# Patient Record
Sex: FEMALE | Race: WHITE | Employment: OTHER | URBAN - METROPOLITAN AREA
[De-identification: names, ages, dates, MRNs, and addresses within clinical notes are randomized per-mention and may not be internally consistent; named-entity substitution may affect disease eponyms.]

---

## 2019-12-07 ENCOUNTER — HOSPITAL ENCOUNTER (EMERGENCY)
Age: 54
Discharge: HOME | End: 2019-12-07
Attending: STUDENT IN AN ORGANIZED HEALTH CARE EDUCATION/TRAINING PROGRAM
Payer: MEDICARE

## 2019-12-07 ENCOUNTER — APPOINTMENT (OUTPATIENT)
Dept: CT IMAGING | Age: 54
End: 2019-12-07
Attending: STUDENT IN AN ORGANIZED HEALTH CARE EDUCATION/TRAINING PROGRAM
Payer: MEDICARE

## 2019-12-07 ENCOUNTER — APPOINTMENT (OUTPATIENT)
Dept: GENERAL RADIOLOGY | Age: 54
End: 2019-12-07
Attending: STUDENT IN AN ORGANIZED HEALTH CARE EDUCATION/TRAINING PROGRAM
Payer: MEDICARE

## 2019-12-07 VITALS
RESPIRATION RATE: 19 BRPM | HEART RATE: 78 BPM | DIASTOLIC BLOOD PRESSURE: 64 MMHG | SYSTOLIC BLOOD PRESSURE: 120 MMHG | OXYGEN SATURATION: 97 %

## 2019-12-07 DIAGNOSIS — T40.2X1A OPIOID OVERDOSE, ACCIDENTAL OR UNINTENTIONAL, INITIAL ENCOUNTER (HCC): ICD-10-CM
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
## 2019-12-07 LAB
ALBUMIN SERPL-MCNC: 3.4 G/DL (ref 3.5–5)
ALBUMIN/GLOB SERPL: 1.1 {RATIO} (ref 1.1–2.2)
ALP SERPL-CCNC: 47 U/L (ref 45–117)
ALT SERPL-CCNC: 11 U/L (ref 12–78)
AMPHET UR QL SCN: NEGATIVE
ANION GAP SERPL CALC-SCNC: 8 MMOL/L (ref 5–15)
APPEARANCE UR: CLEAR
AST SERPL-CCNC: 20 U/L (ref 15–37)
BACTERIA URNS QL MICRO: NEGATIVE /HPF
BARBITURATES UR QL SCN: NEGATIVE
BASOPHILS # BLD: 0 K/UL (ref 0–0.1)
BASOPHILS NFR BLD: 0 % (ref 0–1)
BENZODIAZ UR QL: NEGATIVE
BILIRUB SERPL-MCNC: 0.6 MG/DL (ref 0.2–1)
BILIRUB UR QL: NEGATIVE
BUN SERPL-MCNC: 13 MG/DL (ref 6–20)
BUN/CREAT SERPL: 16 (ref 12–20)
CALCIUM SERPL-MCNC: 8.2 MG/DL (ref 8.5–10.1)
CANNABINOIDS UR QL SCN: NEGATIVE
CHLORIDE SERPL-SCNC: 106 MMOL/L (ref 97–108)
CO2 SERPL-SCNC: 24 MMOL/L (ref 21–32)
COCAINE UR QL SCN: NEGATIVE
COLOR UR: ABNORMAL
CREAT SERPL-MCNC: 0.83 MG/DL (ref 0.55–1.02)
DIFFERENTIAL METHOD BLD: ABNORMAL
DRUG SCRN COMMENT,DRGCM: ABNORMAL
EOSINOPHIL # BLD: 0.3 K/UL (ref 0–0.4)
EOSINOPHIL NFR BLD: 3 % (ref 0–7)
EPITH CASTS URNS QL MICRO: ABNORMAL /LPF
ERYTHROCYTE [DISTWIDTH] IN BLOOD BY AUTOMATED COUNT: 13.5 % (ref 11.5–14.5)
ETHANOL SERPL-MCNC: <10 MG/DL
GLOBULIN SER CALC-MCNC: 3 G/DL (ref 2–4)
GLUCOSE BLD STRIP.AUTO-MCNC: 72 MG/DL (ref 65–100)
GLUCOSE BLD STRIP.AUTO-MCNC: 74 MG/DL (ref 65–100)
GLUCOSE SERPL-MCNC: 79 MG/DL (ref 65–100)
GLUCOSE UR STRIP.AUTO-MCNC: NEGATIVE MG/DL
HCT VFR BLD AUTO: 32.9 % (ref 35–47)
HGB BLD-MCNC: 10.9 G/DL (ref 11.5–16)
HGB UR QL STRIP: ABNORMAL
HYALINE CASTS URNS QL MICRO: ABNORMAL /LPF (ref 0–5)
IMM GRANULOCYTES # BLD AUTO: 0 K/UL (ref 0–0.04)
IMM GRANULOCYTES NFR BLD AUTO: 0 % (ref 0–0.5)
INR PPP: 1 (ref 0.9–1.1)
KETONES UR QL STRIP.AUTO: ABNORMAL MG/DL
LEUKOCYTE ESTERASE UR QL STRIP.AUTO: ABNORMAL
LYMPHOCYTES # BLD: 1.1 K/UL (ref 0.8–3.5)
LYMPHOCYTES NFR BLD: 11 % (ref 12–49)
MCH RBC QN AUTO: 32.2 PG (ref 26–34)
MCHC RBC AUTO-ENTMCNC: 33.1 G/DL (ref 30–36.5)
MCV RBC AUTO: 97.3 FL (ref 80–99)
METHADONE UR QL: NEGATIVE
MONOCYTES # BLD: 1 K/UL (ref 0–1)
MONOCYTES NFR BLD: 10 % (ref 5–13)
NEUTS SEG # BLD: 7.5 K/UL (ref 1.8–8)
NEUTS SEG NFR BLD: 76 % (ref 32–75)
NITRITE UR QL STRIP.AUTO: NEGATIVE
NRBC # BLD: 0 K/UL (ref 0–0.01)
NRBC BLD-RTO: 0 PER 100 WBC
OPIATES UR QL: POSITIVE
PCP UR QL: NEGATIVE
PH UR STRIP: 6 [PH] (ref 5–8)
PLATELET # BLD AUTO: 219 K/UL (ref 150–400)
PMV BLD AUTO: 10.4 FL (ref 8.9–12.9)
POTASSIUM SERPL-SCNC: 3.3 MMOL/L (ref 3.5–5.1)
PROT SERPL-MCNC: 6.4 G/DL (ref 6.4–8.2)
PROT UR STRIP-MCNC: NEGATIVE MG/DL
PROTHROMBIN TIME: 10.3 SEC (ref 9–11.1)
RBC # BLD AUTO: 3.38 M/UL (ref 3.8–5.2)
RBC #/AREA URNS HPF: ABNORMAL /HPF (ref 0–5)
SERVICE CMNT-IMP: NORMAL
SERVICE CMNT-IMP: NORMAL
SODIUM SERPL-SCNC: 138 MMOL/L (ref 136–145)
SP GR UR REFRACTOMETRY: 1.01 (ref 1–1.03)
TROPONIN I SERPL-MCNC: <0.05 NG/ML
UR CULT HOLD, URHOLD: NORMAL
UROBILINOGEN UR QL STRIP.AUTO: 0.2 EU/DL (ref 0.2–1)
WBC # BLD AUTO: 10 K/UL (ref 3.6–11)
WBC URNS QL MICRO: ABNORMAL /HPF (ref 0–4)

## 2019-12-07 PROCEDURE — 36415 COLL VENOUS BLD VENIPUNCTURE: CPT

## 2019-12-07 PROCEDURE — 80307 DRUG TEST PRSMV CHEM ANLYZR: CPT

## 2019-12-07 PROCEDURE — 85610 PROTHROMBIN TIME: CPT

## 2019-12-07 PROCEDURE — 74011250636 HC RX REV CODE- 250/636

## 2019-12-07 PROCEDURE — 99284 EMERGENCY DEPT VISIT MOD MDM: CPT

## 2019-12-07 PROCEDURE — 71045 X-RAY EXAM CHEST 1 VIEW: CPT

## 2019-12-07 PROCEDURE — 93005 ELECTROCARDIOGRAM TRACING: CPT

## 2019-12-07 PROCEDURE — 72125 CT NECK SPINE W/O DYE: CPT

## 2019-12-07 PROCEDURE — 70450 CT HEAD/BRAIN W/O DYE: CPT

## 2019-12-07 PROCEDURE — 80053 COMPREHEN METABOLIC PANEL: CPT

## 2019-12-07 PROCEDURE — 85025 COMPLETE CBC W/AUTO DIFF WBC: CPT

## 2019-12-07 PROCEDURE — 96376 TX/PRO/DX INJ SAME DRUG ADON: CPT

## 2019-12-07 PROCEDURE — 81001 URINALYSIS AUTO W/SCOPE: CPT

## 2019-12-07 PROCEDURE — 74011250636 HC RX REV CODE- 250/636: Performed by: STUDENT IN AN ORGANIZED HEALTH CARE EDUCATION/TRAINING PROGRAM

## 2019-12-07 PROCEDURE — 72131 CT LUMBAR SPINE W/O DYE: CPT

## 2019-12-07 PROCEDURE — 96374 THER/PROPH/DIAG INJ IV PUSH: CPT

## 2019-12-07 PROCEDURE — 82962 GLUCOSE BLOOD TEST: CPT

## 2019-12-07 PROCEDURE — 84484 ASSAY OF TROPONIN QUANT: CPT

## 2019-12-07 RX ORDER — DULOXETIN HYDROCHLORIDE 60 MG/1
60 CAPSULE, DELAYED RELEASE ORAL DAILY
COMMUNITY

## 2019-12-07 RX ORDER — MORPHINE SULFATE 30 MG/1
30 TABLET, FILM COATED, EXTENDED RELEASE ORAL EVERY 8 HOURS
COMMUNITY

## 2019-12-07 RX ORDER — NALOXONE HYDROCHLORIDE 1 MG/ML
INJECTION INTRAMUSCULAR; INTRAVENOUS; SUBCUTANEOUS
Status: DISCONTINUED
Start: 2019-12-07 | End: 2019-12-07 | Stop reason: HOSPADM

## 2019-12-07 RX ORDER — PANTOPRAZOLE SODIUM 40 MG/1
40 TABLET, DELAYED RELEASE ORAL DAILY
COMMUNITY

## 2019-12-07 RX ORDER — HYDROXYCHLOROQUINE SULFATE 200 MG/1
200 TABLET, FILM COATED ORAL DAILY
COMMUNITY

## 2019-12-07 RX ORDER — MORPHINE SULFATE 15 MG/1
15 TABLET ORAL
COMMUNITY

## 2019-12-07 RX ORDER — CYANOCOBALAMIN 1000 UG/ML
1000 INJECTION, SOLUTION INTRAMUSCULAR; SUBCUTANEOUS ONCE
COMMUNITY

## 2019-12-07 RX ORDER — HEPARIN 100 UNIT/ML
300 SYRINGE INTRAVENOUS
Status: COMPLETED | OUTPATIENT
Start: 2019-12-07 | End: 2019-12-07

## 2019-12-07 RX ORDER — PREGABALIN 200 MG/1
200 CAPSULE ORAL 2 TIMES DAILY
COMMUNITY

## 2019-12-07 RX ORDER — NALOXONE HYDROCHLORIDE 1 MG/ML
0.4 INJECTION INTRAMUSCULAR; INTRAVENOUS; SUBCUTANEOUS
Status: DISCONTINUED | OUTPATIENT
Start: 2019-12-07 | End: 2019-12-07 | Stop reason: HOSPADM

## 2019-12-07 RX ORDER — NALOXONE HYDROCHLORIDE 0.4 MG/ML
INJECTION, SOLUTION INTRAMUSCULAR; INTRAVENOUS; SUBCUTANEOUS
Status: COMPLETED
Start: 2019-12-07 | End: 2019-12-07

## 2019-12-07 RX ORDER — SODIUM CHLORIDE 0.9 % (FLUSH) 0.9 %
10 SYRINGE (ML) INJECTION AS NEEDED
Status: DISCONTINUED | OUTPATIENT
Start: 2019-12-07 | End: 2019-12-07 | Stop reason: HOSPADM

## 2019-12-07 RX ORDER — BACLOFEN 10 MG/1
10 TABLET ORAL 3 TIMES DAILY
COMMUNITY

## 2019-12-07 RX ORDER — DIVALPROEX SODIUM 500 MG/1
500 TABLET, DELAYED RELEASE ORAL 2 TIMES DAILY
COMMUNITY

## 2019-12-07 RX ORDER — TOPIRAMATE 50 MG/1
50 TABLET, FILM COATED ORAL 2 TIMES DAILY
COMMUNITY

## 2019-12-07 RX ORDER — KETOROLAC TROMETHAMINE 30 MG/ML
15 INJECTION, SOLUTION INTRAMUSCULAR; INTRAVENOUS
Status: DISCONTINUED | OUTPATIENT
Start: 2019-12-07 | End: 2019-12-07 | Stop reason: HOSPADM

## 2019-12-07 RX ORDER — NALOXONE HYDROCHLORIDE 1 MG/ML
0.4 INJECTION INTRAMUSCULAR; INTRAVENOUS; SUBCUTANEOUS
Status: DISCONTINUED | OUTPATIENT
Start: 2019-12-07 | End: 2019-12-07

## 2019-12-07 RX ORDER — NALOXONE HYDROCHLORIDE 1 MG/ML
0.2 INJECTION INTRAMUSCULAR; INTRAVENOUS; SUBCUTANEOUS
Status: COMPLETED | OUTPATIENT
Start: 2019-12-07 | End: 2019-12-07

## 2019-12-07 RX ORDER — METOPROLOL TARTRATE 50 MG/1
50 TABLET ORAL DAILY
COMMUNITY

## 2019-12-07 RX ORDER — IBUPROFEN 200 MG
800 CAPSULE ORAL
COMMUNITY

## 2019-12-07 RX ORDER — MEMANTINE HYDROCHLORIDE 10 MG/1
10 TABLET ORAL DAILY
COMMUNITY

## 2019-12-07 RX ADMIN — NALOXONE HYDROCHLORIDE 0.2 MG: 1 INJECTION PARENTERAL at 18:48

## 2019-12-07 RX ADMIN — NALXONE HYDROCHLORIDE 0.4 MG: 0.4 INJECTION INTRAMUSCULAR; INTRAVENOUS; SUBCUTANEOUS at 16:20

## 2019-12-07 RX ADMIN — HEPARIN 300 UNITS: 100 SYRINGE at 19:22

## 2019-12-07 RX ADMIN — Medication 10 ML: at 19:23

## 2019-12-07 NOTE — ED NOTES
Bedside and Verbal shift change report given to Opal Tabares  (oncoming nurse) by Tia Oppenheim  (offgoing nurse). Report included the following information SBAR, Kardex, ED Summary, Procedure Summary, Intake/Output, MAR, Accordion, Recent Results and Med Rec Status.

## 2019-12-07 NOTE — ED PROVIDER NOTES
48 y.o. female with past medical history significant for Lupus who presents from home with chief complaint of GLF. Pt presents with AMS and lower back pain post GLF that occurred about an hour ago when the Pt was found fallen in the shower. Per Pt's , Pt has chronic fatigue due to lupus, but was completely alert three hours ago. After the fall, the Pt seemed very fatigued mentally altered. Pt's  denies any EtOH consumption in the Pt, but states that Pt takes medications for lupus which include morphine. Pt denies taking any medications today. Pt's  denies any fever. There are no other acute medical concerns at this time. Social hx: Denies EtOH consumption. Pt takes medications including morphine for treatment of lupus. PCP: No primary care provider on file. Note written by Yunier Berman, as dictated by Jaswinder Leroy MD 4:22 PM    Full history, physical exam, and ROS unable to be obtained due to:  AMS. The history is provided by the patient, the spouse and a parent. No past medical history on file. No past surgical history on file. No family history on file.     Social History     Socioeconomic History    Marital status: Not on file     Spouse name: Not on file    Number of children: Not on file    Years of education: Not on file    Highest education level: Not on file   Occupational History    Not on file   Social Needs    Financial resource strain: Not on file    Food insecurity:     Worry: Not on file     Inability: Not on file    Transportation needs:     Medical: Not on file     Non-medical: Not on file   Tobacco Use    Smoking status: Not on file   Substance and Sexual Activity    Alcohol use: Not on file    Drug use: Not on file    Sexual activity: Not on file   Lifestyle    Physical activity:     Days per week: Not on file     Minutes per session: Not on file    Stress: Not on file   Relationships    Social connections:     Talks on phone: Not on file     Gets together: Not on file     Attends Yarsani service: Not on file     Active member of club or organization: Not on file     Attends meetings of clubs or organizations: Not on file     Relationship status: Not on file    Intimate partner violence:     Fear of current or ex partner: Not on file     Emotionally abused: Not on file     Physically abused: Not on file     Forced sexual activity: Not on file   Other Topics Concern    Not on file   Social History Narrative    Not on file         ALLERGIES: Sulfa (sulfonamide antibiotics)    Review of Systems   Unable to perform ROS: Mental status change       Vitals:    12/07/19 1559   BP: 98/61   Pulse: 91   Resp: 8   SpO2: 90%            Physical Exam  Constitutional:       General: She is not in acute distress. Appearance: She is well-developed. Comments: somnoid with periods of apnea   HENT:      Head: Normocephalic and atraumatic. Eyes:      Conjunctiva/sclera: Conjunctivae normal.   Neck:      Musculoskeletal: Normal range of motion and neck supple. Cardiovascular:      Rate and Rhythm: Normal rate and regular rhythm. Heart sounds: Normal heart sounds. Pulmonary:      Effort: Pulmonary effort is normal. No respiratory distress. Breath sounds: Normal breath sounds. Chest:      Comments: portacath over left chest wall  Abdominal:      Palpations: Abdomen is soft. Tenderness: There is no tenderness. There is no guarding. Musculoskeletal: Normal range of motion. Right lower leg: Edema (trace) present. Left lower leg: Edema (trace) present. Skin:     General: Skin is warm and dry. Neurological:      Mental Status: She is oriented to person, place, and time. Motor: No abnormal muscle tone. Note written by Yunier Bridges, as dictated by Fide Vera MD 4:22 PM       [facility]       Procedures    EKG interpretation: 16:13  Rhythm: normal sinus rhythm; and regular .  Rate (approx.): 81; Axis: normal; Intervals: prolonged QTc at 494 ms; ST/T wave: non-specific changes, no STEMI; likely LVH; EKG documented and interpreted by José Ames MD, ED MD.    6:33 PM  The patient presented with a complaint of a fall or minor trauma. The patient is now resting comfortably and feels better, is alert and in no distress. The patient has become drowsy again but awakens with sternal rub and is neurologically intact. Will give a second dose of Narcan here. Pt recently here from [place]. Took red-eye flight and arrived Thursday morning. Not much sleep since then given care needed for her father who has dementia. The history, exam, diagnostic testing (if any) and current condition do not demonstrate signs of clinically significant intra-cranial, intra-thoracic, intra-abdominal, or musculoskeletal trauma. The vital signs have been stable. 7:06 PM  The patient is up and walking around floor. Went to rest room. The patient's condition is now stable and appropriate for discharge. The patient will pursue further outpatient evaluation with the primary care physician or other designated or consulting physician as indicated in the discharge instructions.

## 2019-12-07 NOTE — ED TRIAGE NOTES
Patient arrives with family who state she was found in the tub after falling; responsive to voice, snoring respirations in triage. Family state patient has Lupus and may have taken medications which includes Morphine but unknown if patient presentation is a result of medications or the fall. Prior to fall, patient was alert per family. Patient taken immediately to ED 2; charge RN Mariela Urban and primary RN Mitch notified and to bedside.

## 2019-12-07 NOTE — ED NOTES
Flight from [place] to [facility] Thursday morning. Patient is in town visiting parents with her , patient's father recently placed in dementia unit.

## 2019-12-07 NOTE — DISCHARGE INSTRUCTIONS
Patient Education        Preventing Falls: Care Instructions  Your Care Instructions    Getting around your home safely can be a challenge if you have injuries or health problems that make it easy for you to fall. Loose rugs and furniture in walkways are among the dangers for many older people who have problems walking or who have poor eyesight. People who have conditions such as arthritis, osteoporosis, or dementia also have to be careful not to fall. You can make your home safer with a few simple measures. Follow-up care is a key part of your treatment and safety. Be sure to make and go to all appointments, and call your doctor if you are having problems. It's also a good idea to know your test results and keep a list of the medicines you take. How can you care for yourself at home? Taking care of yourself  · You may get dizzy if you do not drink enough water. To prevent dehydration, drink plenty of fluids, enough so that your urine is light yellow or clear like water. Choose water and other caffeine-free clear liquids. If you have kidney, heart, or liver disease and have to limit fluids, talk with your doctor before you increase the amount of fluids you drink. · Exercise regularly to improve your strength, muscle tone, and balance. Walk if you can. Swimming may be a good choice if you cannot walk easily. · Have your vision and hearing checked each year or any time you notice a change. If you have trouble seeing and hearing, you might not be able to avoid objects and could lose your balance. · Know the side effects of the medicines you take. Ask your doctor or pharmacist whether the medicines you take can affect your balance. Sleeping pills or sedatives can affect your balance. · Limit the amount of alcohol you drink. Alcohol can impair your balance and other senses. · Ask your doctor whether calluses or corns on your feet need to be removed.  If you wear loose-fitting shoes because of calluses or corns, you can lose your balance and fall. · Talk to your doctor if you have numbness in your feet. Preventing falls at home  · Remove raised doorway thresholds, throw rugs, and clutter. Repair loose carpet or raised areas in the floor. · Move furniture and electrical cords to keep them out of walking paths. · Use nonskid floor wax, and wipe up spills right away, especially on ceramic tile floors. · If you use a walker or cane, put rubber tips on it. If you use crutches, clean the bottoms of them regularly with an abrasive pad, such as steel wool. · Keep your house well lit, especially [address], and outside walkways. Use night-lights in areas such as hallways and bathrooms. Add extra light switches or use remote switches (such as switches that go on or off when you clap your hands) to make it easier to turn lights on if you have to get up during the night. · Install sturdy handrails on stairways. · Move items in your cabinets so that the things you use a lot are on the lower shelves (about waist level). · Keep a cordless phone and a flashlight with new batteries by your bed. If possible, put a phone in each of the main rooms of your house, or carry a cell phone in case you fall and cannot reach a phone. Or, you can wear a device around your neck or wrist. You push a button that sends a signal for help. · Wear low-heeled shoes that fit well and give your feet good support. Use footwear with nonskid soles. Check the heels and soles of your shoes for wear. Repair or replace worn heels or soles. · Do not wear socks without shoes on wood floors. · Walk on the grass when the sidewalks are slippery. If you live in an area that gets snow and ice in the winter, sprinkle salt on slippery steps and sidewalks. Preventing falls in the bath  · Install grab bars and nonskid mats inside and outside your shower or tub and near the toilet and sinks. · Use shower chairs and bath benches.   · Use a hand-held shower head that will allow you to sit while showering. · Get into a tub or shower by putting the weaker leg in first. Get out of a tub or shower with your strong side first.  · Repair loose toilet seats and consider installing a raised toilet seat to make getting on and off the toilet easier. · Keep your bathroom door unlocked while you are in the shower. Where can you learn more? Go to http://jorge-lexx.info/. Enter 0476 [id number] 69 [id number] in the search box to learn more about \"Preventing Falls: Care Instructions. \"  Current as of: November 7, 2018  Content Version: 12.2  © 9077-2461 [employer]. Care instructions adapted under license by [employer] (which disclaims liability or warranty for this information). If you have questions about a medical condition or this instruction, always ask your healthcare professional. [place] [address] any warranty or liability for your use of this information. Patient Education        Opioid Overdose: Care Instructions  Your Care Instructions    You have had treatment to help your body recover from taking too much of an opioid. You are getting better, but you may not feel well for a while. It takes time for the opioids to leave your body. How long it takes to feel better depends on which drug you took and how much you took of it. Opioids include illegal drugs such as heroin, often called smack, junk, H, and ska. Opioids also include medicines that doctors prescribe to treat pain. These are medicines such as oxycodone, methadone, and buprenorphine. They are sometimes sold and used illegally. Taking too much of an opioid can be dangerous. It may cause:  · Trouble breathing. · Low blood pressure. · A low heart rate. · A coma. When the doctor treated you for the overdose, he or she may have:  · Watched your symptoms or done tests to find out what kind of drug you took. · Given you fluids.   · Given you oxygen to help you breathe. · Given you a medicine called naloxone to help reverse the effects of the opioid. · Done several tests, including blood tests, to see how you're responding to treatment. The doctor also watched you carefully to make sure you were recovering safely. Follow-up care is a key part of your treatment and safety. Be sure to make and go to all appointments, and call your doctor if you are having problems. It's also a good idea to know your test results and keep a list of the medicines you take. How can you care for yourself at home? · If you take opioids regularly, your body gets used to them. This is called physical dependence. If you are physically dependent on opioids, you may have withdrawal symptoms when you stop using them or use less. These symptoms can include nausea, sweating, chills, diarrhea, stomach cramps, and muscle aches. Withdrawal can last up to several weeks, depending on which opioid you took and how long you took it. You may feel very ill, but you probably aren't in medical danger. · Your doctor may give you medicine to help you feel better. To help you get through withdrawal, you can also:  ? Get plenty of rest.  ? Drink plenty of fluids. ? Stay active. ? Eat a healthy diet. · If you had a tube in your throat to help you breathe, you may have a sore throat or hoarseness that can last a few days. Sip liquids to help soothe your throat. · Do not drink alcohol or take illegal drugs. · Do not take medicines that make you tired, like sleeping pills or muscle relaxers. · Do not drive if you feel sleepy or groggy while you recover from an overdose. · Get help to stop using opioids. Talk to your doctor about substance use treatment programs. · Talk to your doctor or pharmacist about having a naloxone rescue kit on hand. When should you call for help? Call 911 anytime you think you may need emergency care.  For example, call if:    · You feel you cannot stop from hurting yourself or someone else.   [facility] your doctor now or seek immediate medical care if:    · You have new or worse withdrawal symptoms, such as:  ? Stomach cramps. ? Vomiting. ? Diarrhea. ? Muscle aches. ? Sweating.    Watch closely for changes in your health, and be sure to contact your doctor if:    · You do not get better as expected. Where can you learn more? Go to http://jorge-lexx.info/. Enter [id number] in the search box to learn more about \"Opioid Overdose: Care Instructions. \"  Current as of: February 5, 2019  Content Version: 12.2  © 5269-5479 [employer]. Care instructions adapted under license by [employer] (which disclaims liability or warranty for this information). If you have questions about a medical condition or this instruction, always ask your healthcare professional. [address] any warranty or liability for your use of this information.

## 2019-12-08 LAB
ATRIAL RATE: 81 BPM
CALCULATED P AXIS, ECG09: 25 DEGREES
CALCULATED R AXIS, ECG10: -13 DEGREES
CALCULATED T AXIS, ECG11: 14 DEGREES
DIAGNOSIS, 93000: NORMAL
P-R INTERVAL, ECG05: 142 MS
Q-T INTERVAL, ECG07: 426 MS
QRS DURATION, ECG06: 94 MS
QTC CALCULATION (BEZET), ECG08: 494 MS
VENTRICULAR RATE, ECG03: 81 BPM

## 2025-06-04 ENCOUNTER — HOSPITAL ENCOUNTER (EMERGENCY)
Facility: HOSPITAL | Age: 60
Discharge: HOME | End: 2025-06-04
Attending: STUDENT IN AN ORGANIZED HEALTH CARE EDUCATION/TRAINING PROGRAM
Payer: MEDICARE

## 2025-06-04 ENCOUNTER — APPOINTMENT (OUTPATIENT)
Facility: HOSPITAL | Age: 60
End: 2025-06-04
Payer: MEDICARE

## 2025-06-04 VITALS
WEIGHT: 143 LBS | TEMPERATURE: 99.9 F | SYSTOLIC BLOOD PRESSURE: 112 MMHG | HEIGHT: 60 IN | OXYGEN SATURATION: 100 % | RESPIRATION RATE: 18 BRPM | DIASTOLIC BLOOD PRESSURE: 75 MMHG | BODY MASS INDEX: 28.07 KG/M2 | HEART RATE: 120 BPM

## 2025-06-04 DIAGNOSIS — S92.255A CLOSED NONDISPLACED FRACTURE OF NAVICULAR BONE OF LEFT FOOT, INITIAL ENCOUNTER: Primary | ICD-10-CM

## 2025-06-04 DIAGNOSIS — S93.402A SPRAIN OF LEFT ANKLE, UNSPECIFIED LIGAMENT, INITIAL ENCOUNTER: ICD-10-CM

## 2025-06-04 PROCEDURE — 73610 X-RAY EXAM OF ANKLE: CPT

## 2025-06-04 PROCEDURE — 99283 EMERGENCY DEPT VISIT LOW MDM: CPT

## 2025-06-04 PROCEDURE — 73630 X-RAY EXAM OF FOOT: CPT

## 2025-06-04 ASSESSMENT — ENCOUNTER SYMPTOMS
EYE PAIN: 0
BACK PAIN: 0
DIARRHEA: 0
NAUSEA: 0
ABDOMINAL PAIN: 0
CHEST TIGHTNESS: 0
SORE THROAT: 0
VOMITING: 0
FACIAL SWELLING: 0
EYE DISCHARGE: 0
SHORTNESS OF BREATH: 0
COUGH: 0

## 2025-06-04 ASSESSMENT — PAIN - FUNCTIONAL ASSESSMENT: PAIN_FUNCTIONAL_ASSESSMENT: 0-10

## 2025-06-04 ASSESSMENT — PAIN DESCRIPTION - ORIENTATION: ORIENTATION: LEFT

## 2025-06-04 ASSESSMENT — PAIN DESCRIPTION - LOCATION: LOCATION: ANKLE;FOOT

## 2025-06-04 ASSESSMENT — PAIN SCALES - GENERAL: PAINLEVEL_OUTOF10: 10

## 2025-06-04 NOTE — ED PROVIDER NOTES
[facility] EMERGENCY DEPARTMENT  EMERGENCY DEPARTMENT ENCOUNTER      Pt Name: Aurora Mitchell  MRN: [id number]  Birthdate 1965  Date of evaluation: 6/4/2025  Provider: Rafael Toro MD    CHIEF COMPLAINT       Chief Complaint   Patient presents with    Ankle Pain         HISTORY OF PRESENT ILLNESS   (Location/Symptom, Timing/Onset, Context/Setting, Quality, Duration, Modifying Factors, Severity)  Note limiting factors.   59-year-old female with severe rheumatoid arthritis presents today with left ankle and foot injury from twisting the ankle 2 days ago.  No lacerations or open wound to this injury.  Mild swelling and difficulty bearing weight.  She has had bilateral knee replacements and takes morphine daily for chronic pain.    The history is provided by the patient.   Leg Injury  Location:  Foot and ankle  Injury: yes    Mechanism of injury: fall    Ankle location:  L ankle  Foot location:  L foot  Pain details:     Quality:  Aching    Radiates to:  Does not radiate    Severity:  Mild    Onset quality:  Gradual    Timing:  Constant    Progression:  Worsening  Associated symptoms: no back pain          Review of External Medical Records:     Nursing Notes were reviewed.    REVIEW OF SYSTEMS    (2-9 systems for level 4, 10 or more for level 5)     Review of Systems   Constitutional:  Negative for activity change, appetite change, chills and diaphoresis.   HENT:  Negative for congestion, ear pain, facial swelling and sore throat.    Eyes:  Negative for pain, discharge and visual disturbance.   Respiratory:  Negative for cough, chest tightness and shortness of breath.    Cardiovascular:  Negative for chest pain and palpitations.   Gastrointestinal:  Negative for abdominal pain, diarrhea, nausea and vomiting.   Endocrine: Negative for cold intolerance, heat intolerance, polydipsia and polyphagia.   Genitourinary:  Negative for difficulty urinating, dysuria, frequency, hematuria, urgency  Systolic BP Percentile Diastolic BP Percentile Temp Temp Source Pulse Respirations SpO2   116/82 -- -- 100.2 °F (37.9 °C) Oral (!) 120 16 97 %      Height Weight - Scale         1.524 m (5') 64.9 kg (143 lb)             Body mass index is 27.93 kg/m².    Physical Exam  Vitals and nursing note reviewed.   Constitutional:       Appearance: She is normal weight.   HENT:      Head: Normocephalic and atraumatic.   Eyes:      Extraocular Movements: Extraocular movements intact.      Conjunctiva/sclera: Conjunctivae normal.      Pupils: Pupils are equal, round, and reactive to light.   Cardiovascular:      Rate and Rhythm: Normal rate and regular rhythm.      Pulses: Normal pulses.      Heart sounds: Normal heart sounds. No murmur heard.  Pulmonary:      Effort: Pulmonary effort is normal. No respiratory distress.      Breath sounds: No stridor. No wheezing.   Abdominal:      General: Abdomen is flat. Bowel sounds are normal. There is no distension.      Palpations: Abdomen is soft.      Tenderness: There is no abdominal tenderness.   Musculoskeletal:         General: Normal range of motion.      Cervical back: Normal range of motion. No rigidity.   Lymphadenopathy:      Cervical: No cervical adenopathy.   Skin:     General: Skin is warm and dry.   Neurological:      General: No focal deficit present.      Mental Status: She is alert and oriented to person, place, and time. Mental status is at baseline.   Psychiatric:         Mood and Affect: Mood normal.         Behavior: Behavior normal.         DIAGNOSTIC RESULTS     EKG: All EKG's are interpreted by the Emergency Department Physician who either signs or Co-signs this chart in the absence of a cardiologist.        RADIOLOGY:   Non-plain film images such as CT, Ultrasound and MRI are read by the radiologist. Plain radiographic images are visualized and preliminarily interpreted by the emergency physician with the below findings:        Interpretation per the Radiologist

## 2025-06-04 NOTE — ED TRIAGE NOTES
Pt arrives POV. CC Left foot and ankle pain. Pt was reaching down to grab something off the floor and fell down twisting her left ankle 2 days ago.     Denies hitting her head or LOC.     Pt took morphine before coming in    PMH- lupus